# Patient Record
Sex: FEMALE | Race: WHITE | Employment: FULL TIME | URBAN - NONMETROPOLITAN AREA
[De-identification: names, ages, dates, MRNs, and addresses within clinical notes are randomized per-mention and may not be internally consistent; named-entity substitution may affect disease eponyms.]

---
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
## 2021-05-04 PROCEDURE — 87635 SARS-COV-2 COVID-19 AMP PRB: CPT | Performed by: FAMILY MEDICINE

## 2021-06-09 ENCOUNTER — TELEMEDICINE (OUTPATIENT)
Dept: OBGYN CLINIC | Age: 21
End: 2021-06-09
Payer: MEDICAID

## 2021-06-09 DIAGNOSIS — F32.81 PMDD (PREMENSTRUAL DYSPHORIC DISORDER): ICD-10-CM

## 2021-06-09 DIAGNOSIS — Z30.41 ENCOUNTER FOR SURVEILLANCE OF CONTRACEPTIVE PILLS: Primary | ICD-10-CM

## 2021-06-09 PROCEDURE — 99213 OFFICE O/P EST LOW 20 MIN: CPT | Performed by: NURSE PRACTITIONER

## 2021-06-09 PROCEDURE — G8427 DOCREV CUR MEDS BY ELIG CLIN: HCPCS | Performed by: NURSE PRACTITIONER

## 2021-06-09 RX ORDER — NORETHINDRONE ACETATE AND ETHINYL ESTRADIOL 1MG-20(21)
KIT ORAL
Qty: 3 PACKET | Refills: 3 | Status: SHIPPED | OUTPATIENT
Start: 2021-06-09 | End: 2022-10-04 | Stop reason: SDUPTHER

## 2021-06-09 ASSESSMENT — ENCOUNTER SYMPTOMS
RESPIRATORY NEGATIVE: 1
EYES NEGATIVE: 1
GASTROINTESTINAL NEGATIVE: 1

## 2021-06-09 NOTE — PROGRESS NOTES
[facility]  Nurse Practitioner Office Note  TELEHEALTH EVALUATION -- Audio/Visual (During OCTUT-30 public health emergency)    Fer Lawson is a 24 y.o. female who presents today for her medical conditions/ complaints as noted below. Chief Complaint   Patient presents with    Medication Refill         HPI  Pt needs refill on ocp. Doing very well, periods good, light. There is no problem list on file for this patient. No LMP recorded. No obstetric history on file. Past Medical History:   Diagnosis Date    Mononucleosis      History reviewed. No pertinent surgical history. Family History   Problem Relation Age of Onset    Heart Attack Maternal Grandfather     Heart Disease Paternal Grandmother     Cancer Paternal Grandfather         lung     Social History     Tobacco Use    Smoking status: Never Smoker    Smokeless tobacco: Never Used   Substance Use Topics    Alcohol use: No       Current Outpatient Medications   Medication Sig Dispense Refill    norethindrone-ethinyl estradiol (AUROVELA FE 1/20) 1-20 MG-MCG per tablet TAKE 1 TABLET BY MOUTH DAILY 3 packet 3     No current facility-administered medications for this visit. No Known Allergies  There were no vitals filed for this visit. There is no height or weight on file to calculate BMI. Review of Systems   Constitutional: Negative. HENT: Negative. Eyes: Negative. Respiratory: Negative. Cardiovascular: Negative. Gastrointestinal: Negative. Genitourinary: Negative for difficulty urinating, dyspareunia, dysuria, enuresis, frequency, hematuria, menstrual problem, pelvic pain, urgency and vaginal discharge. Musculoskeletal: Negative. Skin: Negative. Neurological: Negative. Psychiatric/Behavioral: Negative. Due to this being a TeleHealth encounter, evaluation of the following organ systems is limited: Vitals/Constitutional/EENT/Resp/CV/GI//MS/Neuro/Skin/Heme-Lymph-Imm.     Physical Exam  Constitutional:       General: She is not in acute distress. Appearance: She is well-developed. She is not diaphoretic. HENT:      Head: Normocephalic and atraumatic. Eyes:      Conjunctiva/sclera: Conjunctivae normal.      Pupils: Pupils are equal, round, and reactive to light. Pulmonary:      Effort: Pulmonary effort is normal.   Abdominal:      Tenderness: There is no guarding. Musculoskeletal:         General: Normal range of motion. Cervical back: Normal range of motion. Comments: Normal ROM in all 4 extremities; normal gait   Skin:     General: Skin is warm and dry. Neurological:      Mental Status: She is alert and oriented to person, place, and time. Motor: No abnormal muscle tone. Coordination: Coordination normal.   Psychiatric:         Behavior: Behavior normal.          Diagnosis Orders   1. Encounter for surveillance of contraceptive pills  norethindrone-ethinyl estradiol (AUROVELA FE 1/20) 1-20 MG-MCG per tablet   2. PMDD (premenstrual dysphoric disorder)  norethindrone-ethinyl estradiol (AUROVELA FE 1/20) 1-20 MG-MCG per tablet       MEDICATIONS:  Orders Placed This Encounter   Medications    norethindrone-ethinyl estradiol (AUROVELA FE 1/20) 1-20 MG-MCG per tablet     Sig: TAKE 1 TABLET BY MOUTH DAILY     Dispense:  3 packet     Refill:  3       ORDERS:  No orders of the defined types were placed in this encounter. PLAN:  1. Refilled for the year.   2. . Birth control is not effective against STD's. Risk vs. Benefits discussed. We discussed if SOB, chest pain, dizziness, weakness to stop [facility] and call for appointment or procede to ER for evaluation. Patient voiced understanding.        Pursuant to the emergency declaration under the [facility] waiver authority and the [employer] and [employer] Act, this Virtual  Visit was conducted, with patient's consent, to reduce the patient's risk of exposure to COVID-19 and provide continuity of care for an established patient. Services were provided through a video synchronous discussion virtually to substitute for in-person clinic visit.

## 2022-05-16 RX ORDER — NORETHINDRONE ACETATE AND ETHINYL ESTRADIOL AND FERROUS FUMARATE 1MG-20(24)
KIT ORAL
Qty: 140 TABLET | Refills: 0 | Status: SHIPPED | OUTPATIENT
Start: 2022-05-16 | End: 2022-10-04 | Stop reason: ALTCHOICE

## 2022-10-04 DIAGNOSIS — Z30.41 ENCOUNTER FOR SURVEILLANCE OF CONTRACEPTIVE PILLS: ICD-10-CM

## 2022-10-04 DIAGNOSIS — F32.81 PMDD (PREMENSTRUAL DYSPHORIC DISORDER): ICD-10-CM

## 2022-10-04 RX ORDER — NORETHINDRONE ACETATE AND ETHINYL ESTRADIOL 1MG-20(21)
KIT ORAL
Qty: 3 PACKET | Refills: 3 | Status: SHIPPED | OUTPATIENT
Start: 2022-10-04 | End: 2022-10-07

## 2022-10-07 ENCOUNTER — TELEPHONE (OUTPATIENT)
Dept: OBGYN CLINIC | Age: 22
End: 2022-10-07

## 2022-10-07 DIAGNOSIS — F32.81 PMDD (PREMENSTRUAL DYSPHORIC DISORDER): ICD-10-CM

## 2022-10-07 DIAGNOSIS — Z30.41 ENCOUNTER FOR SURVEILLANCE OF CONTRACEPTIVE PILLS: ICD-10-CM

## 2022-10-07 RX ORDER — NORETHINDRONE ACETATE AND ETHINYL ESTRADIOL 1MG-20(21)
KIT ORAL
Qty: 3 PACKET | Refills: 3 | Status: SHIPPED | OUTPATIENT
Start: 2022-10-07

## 2022-10-07 NOTE — TELEPHONE ENCOUNTER
Pt has an appointment in December for a VV appointment with Juan Contreras. She needs a refill on her BC.

## 2022-10-07 NOTE — TELEPHONE ENCOUNTER
LM that I changed her OCP prescription to Gwene[facility] Seip in [place] that is on file in her chart.

## 2022-10-07 NOTE — TELEPHONE ENCOUNTER
Karan calling in regards to medication refill from 10/4. Stated she requested to be sent to [facility] in [place] on file as she is currently in school there.  Please return her call to discuss [phone]      Thank you

## 2022-12-02 ENCOUNTER — TELEMEDICINE (OUTPATIENT)
Dept: OBGYN CLINIC | Age: 22
End: 2022-12-02

## 2022-12-02 DIAGNOSIS — Z30.41 ENCOUNTER FOR SURVEILLANCE OF CONTRACEPTIVE PILLS: Primary | ICD-10-CM

## 2022-12-02 DIAGNOSIS — F32.81 PMDD (PREMENSTRUAL DYSPHORIC DISORDER): ICD-10-CM

## 2022-12-02 RX ORDER — NORETHINDRONE ACETATE AND ETHINYL ESTRADIOL 1MG-20(21)
KIT ORAL
Qty: 3 PACKET | Refills: 3 | Status: SHIPPED | OUTPATIENT
Start: 2022-12-02

## 2022-12-02 ASSESSMENT — ENCOUNTER SYMPTOMS
GASTROINTESTINAL NEGATIVE: 1
RESPIRATORY NEGATIVE: 1
EYES NEGATIVE: 1
CONSTIPATION: 0
DIARRHEA: 0
ALLERGIC/IMMUNOLOGIC NEGATIVE: 1

## 2022-12-02 NOTE — PROGRESS NOTES
Nicola Rodriguez is a 25 y.o. female who presents today for her medical conditions/ complaints as noted below. Nicola Rodriguez is c/o of Medication Refill    Nicola Rodriguez, was evaluated through a synchronous (real-time) audio-video encounter. The patient (or guardian if applicable) is aware that this is a billable service, which includes applicable co-pays. This Virtual Visit was conducted with patient's (and/or legal guardian's) consent. The visit was conducted pursuant to the emergency declaration under the [facility], [address] [place] [address] authority and the [employer] and [employer] Act. Patient identification was verified, and a caregiver was present when appropriate. The patient was located at Home: [address] [address]. Provider was located at [facility] (Appt Dept): Guille Kapadia [address] [place] [address]  [address] [address]. HPI  Pt presents for yearly f/u on OCP. Has been doing well, no issues with compliance. Has one new sexual partner. Does not use condoms for back up. 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 Taking Vitamin C and D per PCP. No LMP recorded. No obstetric history on file. Past Medical History:   Diagnosis Date    Behcet's disease ([facility] [address]     Mononucleosis      Past Surgical History:   Procedure Laterality Date    INCISION AND DRAINAGE OF TONSILLAR ABSCESS       Family History   Problem Relation Age of Onset    Heart Attack Maternal Grandfather     Heart Disease Paternal Grandmother     Cancer Paternal Grandfather         lung     Social History     Tobacco Use    Smoking status: Never    Smokeless tobacco: Never   Substance Use Topics    Alcohol use: No       Current Outpatient Medications   Medication Sig Dispense Refill    norethindrone-ethinyl estradiol (AUROVELA FE 1/20) 1-20 MG-MCG per tablet TAKE 1 TABLET BY MOUTH DAILY 3 packet 3     No current facility-administered medications for this visit. No Known Allergies  There were no vitals filed for this visit. There is no height or weight on file to calculate BMI. Review of Systems   Constitutional: Negative. HENT: Negative. Eyes: Negative. Respiratory: Negative. Cardiovascular: Negative. Gastrointestinal: Negative. Negative for constipation and diarrhea. Endocrine: Negative. Genitourinary: Negative. Negative for frequency, menstrual problem and urgency. Musculoskeletal: Negative. Skin: Negative. Allergic/Immunologic: Negative. Neurological: Negative. Hematological: Negative. Psychiatric/Behavioral: Negative. All other systems reviewed and are negative. Physical Exam  Vitals and nursing note reviewed. Constitutional:       Appearance: She is well-developed. HENT:      Head: Normocephalic. Right Ear: External ear normal.      Left Ear: External ear normal.      Nose: Nose normal.   Musculoskeletal:         General: Normal range of motion. Cervical back: Normal range of motion. Skin:     General: Skin is warm and dry. Neurological:      Mental Status: She is alert and oriented to person, place, and time. Psychiatric:         Attention and Perception: Attention normal.         Mood and Affect: Mood normal.         Speech: Speech normal.         Behavior: Behavior normal.         Thought Content: Thought content normal.         Cognition and Memory: Cognition normal.         Judgment: Judgment normal.        Diagnosis Orders   1. Encounter for surveillance of contraceptive pills  norethindrone-ethinyl estradiol (AUROVELA FE 1/20) 1-20 MG-MCG per tablet      2.  PMDD (premenstrual dysphoric disorder)  norethindrone-ethinyl estradiol (AUROVELA FE 1/20) 1-20 MG-MCG per tablet          MEDICATIONS:  Orders Placed This Encounter   Medications    norethindrone-ethinyl estradiol (AUROVELA FE 1/20) 1-20 MG-MCG per tablet     Sig: TAKE 1 TABLET BY MOUTH DAILY     Dispense:  3 packet     Refill:  3 ORDERS:  No orders of the defined types were placed in this encounter. PLAN:  Refilled OCP  Encouraged barrier protection for back up  Pt states she will f/u this summer when she is home from school for pap smear and exam    There are no Patient Instructions on file for this visit.

## 2022-12-18 ENCOUNTER — OFFICE VISIT (OUTPATIENT)
Age: 22
End: 2022-12-18

## 2022-12-18 VITALS
TEMPERATURE: 97.4 F | WEIGHT: 123.2 LBS | BODY MASS INDEX: 20.5 KG/M2 | SYSTOLIC BLOOD PRESSURE: 100 MMHG | HEART RATE: 82 BPM | OXYGEN SATURATION: 99 % | DIASTOLIC BLOOD PRESSURE: 62 MMHG

## 2022-12-18 DIAGNOSIS — J02.0 STREP PHARYNGITIS: Primary | ICD-10-CM

## 2022-12-18 DIAGNOSIS — R05.9 COUGH, UNSPECIFIED TYPE: ICD-10-CM

## 2022-12-18 LAB
INFLUENZA A ANTIBODY: NEGATIVE
INFLUENZA B ANTIBODY: NEGATIVE
S PYO AG THROAT QL: POSITIVE

## 2022-12-18 RX ORDER — AMOXICILLIN AND CLAVULANATE POTASSIUM 875; 125 MG/1; MG/1
1 TABLET, FILM COATED ORAL 2 TIMES DAILY
Qty: 20 TABLET | Refills: 0 | Status: SHIPPED | OUTPATIENT
Start: 2022-12-18 | End: 2022-12-28

## 2022-12-18 ASSESSMENT — ENCOUNTER SYMPTOMS
SORE THROAT: 1
DIARRHEA: 1

## 2022-12-18 NOTE — PROGRESS NOTES
[address]  [address] [address] [place] [address]  Dept: [phone]  Dept Fax: [phone]  Loc: [phone]    Balaji Mathur is a 25 y.o. female who presents today for her medical conditions/complaints as noted below. Balaji Mathur is c/o of Fatigue, Pharyngitis, and Diarrhea        HPI:     HPI  Balaji Mathur presents with complaints of sore throat, diarrhea, congestion, fatigue and body aches. Denies fever. Symptoms began yesterday. No OTC treatment. Denies recent antibiotics and steroids. Reports recent covid19 infection Last month. Vaccinated against [id number]. Past Medical History:   Diagnosis Date    Behcet's disease ([facility] [address]     Mononucleosis      Past Surgical History:   Procedure Laterality Date    INCISION AND DRAINAGE OF TONSILLAR ABSCESS         Family History   Problem Relation Age of Onset    Heart Attack Maternal Grandfather     Heart Disease Paternal Grandmother     Cancer Paternal Grandfather         lung       Social History     Tobacco Use    Smoking status: Never    Smokeless tobacco: Never   Substance Use Topics    Alcohol use: No      Current Outpatient Medications   Medication Sig Dispense Refill    amoxicillin-clavulanate (AUGMENTIN) 875-125 MG per tablet Take 1 tablet by mouth 2 times daily for 10 days 20 tablet 0    norethindrone-ethinyl estradiol (AUROVELA FE 1/20) 1-20 MG-MCG per tablet TAKE 1 TABLET BY MOUTH DAILY 3 packet 3     No current facility-administered medications for this visit.      No Known Allergies    Health Maintenance   Topic Date Due    Depression Monitoring  Never done    HIV screen  Never done    Chlamydia/GC screen  Never done    Hepatitis C screen  Never done    COVID-19 Vaccine (3 - Booster for Pfizer series) 05/08/2021    DTaP/Tdap/Td vaccine (7 - Td or Tdap) 05/18/2022    Pap smear  Never done    Hepatitis A vaccine  Completed    Hib vaccine  Completed    HPV vaccine  Completed    Varicella vaccine Completed    Meningococcal (ACWY) vaccine  Completed    Flu vaccine  Completed    Pneumococcal 0-64 years Vaccine  Aged Out       Subjective:     Review of Systems   Constitutional:  Positive for fatigue. Negative for fever. HENT:  Positive for congestion and sore throat. Gastrointestinal:  Positive for diarrhea. Musculoskeletal:  Positive for myalgias.     :Objective      Physical Exam  Constitutional:       General: She is not in acute distress. Appearance: Normal appearance. She is ill-appearing. She is not toxic-appearing. HENT:      Head: Normocephalic and atraumatic. Right Ear: Tympanic membrane, ear canal and external ear normal.      Left Ear: Tympanic membrane, ear canal and external ear normal.      Nose: Congestion present. Mouth/Throat:      Mouth: Mucous membranes are moist.      Pharynx: Oropharynx is clear. Posterior oropharyngeal erythema (significant) present. No oropharyngeal exudate. Eyes:      General:         Right eye: No discharge. Left eye: No discharge. Conjunctiva/sclera: Conjunctivae normal.   Cardiovascular:      Rate and Rhythm: Normal rate and regular rhythm. Pulmonary:      Effort: Pulmonary effort is normal. No respiratory distress. Abdominal:      General: Abdomen is flat. Palpations: Abdomen is soft. Tenderness: There is no abdominal tenderness. Musculoskeletal:         General: Normal range of motion. Cervical back: Normal range of motion. Lymphadenopathy:      Cervical: No cervical adenopathy. Skin:     General: Skin is warm and dry. Capillary Refill: Capillary refill takes less than 2 seconds. Findings: No rash. Neurological:      General: No focal deficit present. Mental Status: She is alert.    Psychiatric:         Mood and Affect: Mood normal.     /62 (Site: Left Upper Arm, Position: Sitting, Cuff Size: Large Adult)   Pulse 82   Temp 97.4 °F (36.3 °C) (Temporal)   Wt 123 lb 3.2 oz (55.9 kg)   SpO2 99%   BMI 20.50 kg/m²     :Assessment       Diagnosis Orders   1. Strep pharyngitis  POCT rapid strep A    amoxicillin-clavulanate (AUGMENTIN) 875-125 MG per tablet      2. Cough, unspecified type  POCT Influenza A/B          :Plan   Strep +. Augmentin to cover. Continue supportive care. Return precautions and home care education completed. Patient and Parent verbalized understanding. Orders Placed This Encounter   Procedures    POCT Influenza A/B    POCT rapid strep A     Results for orders placed or performed in visit on 12/18/22   POCT Influenza A/B   Result Value Ref Range    Influenza A Ab negative     Influenza B Ab negative    POCT rapid strep A   Result Value Ref Range    Strep A Ag Positive (A) None Detected       No follow-ups on file. Orders Placed This Encounter   Medications    amoxicillin-clavulanate (AUGMENTIN) 875-125 MG per tablet     Sig: Take 1 tablet by mouth 2 times daily for 10 days     Dispense:  20 tablet     Refill:  0       Patient given educational materials- see patient instructions. Discussed use, benefit, and side effects of prescribed medications. All patient questions answered. Pt voiced understanding. Patient Instructions   1. Antibiotics for full 10 days  2. Increase water intake  3. Stay home until fever free for 24 hours  4. Throw away toothbrush after 3rd full day of antibiotic  5. Avoid sharing drinks or food for at least 48 hours. 6. Monitor for rash, vomiting with inability to hold down medication or high fever that won't break - return or contact PCP if they occur  7.  Warm salt water gargles or 1 teaspoon of honey every 4 hours for sore throat      Electronically signed by BRITNEY Estrada CNP on 12/18/2022 at 11:53 AM

## 2023-02-01 ENCOUNTER — TELEPHONE (OUTPATIENT)
Dept: OTOLARYNGOLOGY | Facility: CLINIC | Age: 23
End: 2023-02-01
Payer: COMMERCIAL